# Patient Record
Sex: FEMALE | Race: WHITE | URBAN - METROPOLITAN AREA
[De-identification: names, ages, dates, MRNs, and addresses within clinical notes are randomized per-mention and may not be internally consistent; named-entity substitution may affect disease eponyms.]

---

## 2020-01-09 ENCOUNTER — OFFICE VISIT (OUTPATIENT)
Dept: URBAN - METROPOLITAN AREA CLINIC 22 | Facility: CLINIC | Age: 60
End: 2020-01-09
Payer: COMMERCIAL

## 2020-01-09 DIAGNOSIS — B02.39 OTHER HERPES ZOSTER EYE DISEASE: Primary | ICD-10-CM

## 2020-01-09 PROCEDURE — 99203 OFFICE O/P NEW LOW 30 MIN: CPT | Performed by: OPTOMETRIST

## 2020-01-09 RX ORDER — VALACYCLOVIR HYDROCHLORIDE 1 G/1
TABLET, FILM COATED ORAL
Qty: 30 | Refills: 0 | Status: INACTIVE
Start: 2020-01-09 | End: 2020-01-18

## 2020-01-09 ASSESSMENT — INTRAOCULAR PRESSURE
OS: 15
OD: 15

## 2020-01-09 NOTE — IMPRESSION/PLAN
Impression: Other herpes zoster eye disease: B02.39. Plan: Valrex 1 g p.o TID x 10 days. RTC 1 week.

## 2020-01-27 ENCOUNTER — NEW PATIENT (OUTPATIENT)
Dept: URBAN - METROPOLITAN AREA CLINIC 30 | Facility: CLINIC | Age: 60
End: 2020-01-27
Payer: COMMERCIAL

## 2020-01-27 DIAGNOSIS — H18.831 RECURRENT CORNEAL EROSION OF CORNEA, RIGHT EYE: ICD-10-CM

## 2020-01-27 PROCEDURE — 92004 COMPRE OPH EXAM NEW PT 1/>: CPT | Performed by: OPTOMETRIST

## 2020-01-27 PROCEDURE — 92134 CPTRZ OPH DX IMG PST SGM RTA: CPT | Performed by: OPTOMETRIST

## 2020-01-27 ASSESSMENT — INTRAOCULAR PRESSURE
OD: 14
OS: 13

## 2020-01-27 ASSESSMENT — VISUAL ACUITY
OS: 20/30
OD: 20/25

## 2020-01-27 ASSESSMENT — KERATOMETRY
OD: 43.34
OS: 43.66

## 2021-12-08 ENCOUNTER — OFFICE VISIT (OUTPATIENT)
Dept: URBAN - METROPOLITAN AREA CLINIC 30 | Facility: CLINIC | Age: 61
End: 2021-12-08
Payer: COMMERCIAL

## 2021-12-08 DIAGNOSIS — H04.123 TEAR FILM INSUFFICIENCY OF BILATERAL LACRIMAL GLANDS: ICD-10-CM

## 2021-12-08 PROCEDURE — 92014 COMPRE OPH EXAM EST PT 1/>: CPT | Performed by: OPTOMETRIST

## 2021-12-08 PROCEDURE — 92134 CPTRZ OPH DX IMG PST SGM RTA: CPT | Performed by: OPTOMETRIST

## 2021-12-08 ASSESSMENT — VISUAL ACUITY
OS: 20/30
OD: 20/30

## 2021-12-08 ASSESSMENT — INTRAOCULAR PRESSURE
OS: 15
OD: 15

## 2021-12-08 ASSESSMENT — KERATOMETRY
OD: 43.40
OS: 43.81

## 2021-12-08 NOTE — IMPRESSION/PLAN
Impression: Recurrent corneal erosion of cornea, right eye Plan: More likely given history (had serious eye infection related to CL wear in past. 
No recurrences since last visit per pt. Using Refresh Alexi 3 at night. Eyes are comfortable.

## 2021-12-08 NOTE — IMPRESSION/PLAN
Impression: Tear film insufficiency of bilateral lacrimal glands Plan: Using Refresh Alexi 3 drops QHS OU, can increase use prn. Call if worsens. Defers glasses rx, does not wear glasses.

## 2024-01-31 ENCOUNTER — OFFICE VISIT (OUTPATIENT)
Dept: URBAN - METROPOLITAN AREA CLINIC 30 | Facility: CLINIC | Age: 64
End: 2024-01-31
Payer: COMMERCIAL

## 2024-01-31 DIAGNOSIS — H18.593 OTHER HEREDITARY CORNEAL DYSTROPHIES: ICD-10-CM

## 2024-01-31 DIAGNOSIS — R42 DIZZINESS: ICD-10-CM

## 2024-01-31 DIAGNOSIS — H25.813 COMBINED FORMS OF AGE-RELATED CATARACT, BILATERAL: Primary | ICD-10-CM

## 2024-01-31 PROCEDURE — 92014 COMPRE OPH EXAM EST PT 1/>: CPT | Performed by: OPTOMETRIST

## 2024-01-31 PROCEDURE — 92134 CPTRZ OPH DX IMG PST SGM RTA: CPT | Performed by: OPTOMETRIST

## 2024-01-31 ASSESSMENT — KERATOMETRY
OS: 43.84
OD: 43.31

## 2024-01-31 ASSESSMENT — INTRAOCULAR PRESSURE
OS: 15
OD: 15

## 2024-01-31 ASSESSMENT — VISUAL ACUITY
OD: 20/30
OS: 20/20